# Patient Record
Sex: MALE | Race: WHITE | NOT HISPANIC OR LATINO | Employment: FULL TIME | URBAN - NONMETROPOLITAN AREA
[De-identification: names, ages, dates, MRNs, and addresses within clinical notes are randomized per-mention and may not be internally consistent; named-entity substitution may affect disease eponyms.]

---

## 2017-03-23 ENCOUNTER — OFFICE VISIT (OUTPATIENT)
Dept: ORTHOPEDIC SURGERY | Facility: CLINIC | Age: 27
End: 2017-03-23

## 2017-03-23 DIAGNOSIS — S62.661A CLOSED NONDISPLACED FRACTURE OF DISTAL PHALANX OF LEFT INDEX FINGER, INITIAL ENCOUNTER: Primary | ICD-10-CM

## 2017-03-23 PROCEDURE — 99203 OFFICE O/P NEW LOW 30 MIN: CPT | Performed by: ORTHOPAEDIC SURGERY

## 2017-04-05 PROBLEM — S62.661A CLOSED NONDISPLACED FRACTURE OF DISTAL PHALANX OF LEFT INDEX FINGER: Status: ACTIVE | Noted: 2017-04-05

## 2017-04-20 ENCOUNTER — OFFICE VISIT (OUTPATIENT)
Dept: ORTHOPEDIC SURGERY | Facility: CLINIC | Age: 27
End: 2017-04-20

## 2017-04-20 DIAGNOSIS — S62.661D CLOSED NONDISPLACED FRACTURE OF DISTAL PHALANX OF LEFT INDEX FINGER WITH ROUTINE HEALING, SUBSEQUENT ENCOUNTER: ICD-10-CM

## 2017-04-20 DIAGNOSIS — S62.92XD LEFT HAND FRACTURE, WITH ROUTINE HEALING, SUBSEQUENT ENCOUNTER: Primary | ICD-10-CM

## 2017-04-20 PROCEDURE — 73120 X-RAY EXAM OF HAND: CPT | Performed by: ORTHOPAEDIC SURGERY

## 2017-04-20 PROCEDURE — 99213 OFFICE O/P EST LOW 20 MIN: CPT | Performed by: ORTHOPAEDIC SURGERY

## 2017-04-20 NOTE — PROGRESS NOTES
Chief Complaint   Patient presents with   • Left Hand - Follow-up   • Post-op     LEFT FINGER FRACTURE   Patient is here today for a follow up on his left finger fracture. He states that he is still having trouble with it.    HPI patient is doing a little bit better than before.  He had sustained a crushing injury to the soft tissue and the bone of the P3 phalanx of the left index finger while he was at work.  He works in the local [employer].  His swelling and bruising are starting to settle down.  He still has quite a bit of discomfort especially when he tries to make a fist.  He is not able to lift any objects heavier than 5 or 7 pounds because of the pain and discomfort.  Overall he feels like he is improving and getting somewhat better.  He is not ready to go back to work in a safe fashion just yet.        There were no vitals filed for this visit.        Joint/Body Part Specific Exam:  Left hand: The index finger swelling and bruising is starting to settle down.  The swelling is much less than before.  There is no rotatory malalignment.  Cap refill is 2 seconds with a brisk return.  The subungual hematoma is starting to settle down as well.  He is not yet lost the nail plate.  Flexor and extensor tendon function are well preserved.  Local tenderness is consistent with a healing fracture of the P3 of the index finger.  He is able to flex his finger at both the PIP and the DIP joints although not quite able to make a fist just      X-RAY REPORT:  left Hand X-Ray  Indication: Evaluation of healing of the P3 fracture of the index finger  AP, Lateral views  Findings: Acceptable alignment of the P3 fracture with some early callus formation  no bony lesion  Soft tissues within normal limits  within normal limits joint spaces  Hardware appropriately positioned not applicable      yes prior studies available for comparison.    X-RAY was ordered and reviewed by Brad Posadas MD        Jugren was seen today for post-op  and follow-up.    Diagnoses and all orders for this visit:    Left hand fracture, with routine healing, subsequent encounter  -     XR Hand 2 View Left            Procedures        Instructions:      Plan:Continue to splint the finger to allow the distal phalangeal fracture to heal.    Tablet ibuprofen 600 mg tab 1 by mouth twice a day when necessary pain and discomfort.    Gentle active mobilization of the PIP and the DIP joints to prevent posttraumatic stiffness.    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    Re-injury precautions.    Follow-up in my office in 4 weeks for reevaluation and repeat x-rays.

## 2017-04-24 PROBLEM — S62.92XA LEFT HAND FRACTURE: Status: ACTIVE | Noted: 2017-04-24

## 2017-05-18 ENCOUNTER — OFFICE VISIT (OUTPATIENT)
Dept: ORTHOPEDIC SURGERY | Facility: CLINIC | Age: 27
End: 2017-05-18

## 2017-05-18 DIAGNOSIS — S62.631D CLOSED DISPLACED FRACTURE OF DISTAL PHALANX OF LEFT INDEX FINGER WITH ROUTINE HEALING, SUBSEQUENT ENCOUNTER: Primary | ICD-10-CM

## 2017-05-18 PROCEDURE — 73140 X-RAY EXAM OF FINGER(S): CPT | Performed by: ORTHOPAEDIC SURGERY

## 2017-05-18 PROCEDURE — 99213 OFFICE O/P EST LOW 20 MIN: CPT | Performed by: ORTHOPAEDIC SURGERY

## 2017-05-22 PROBLEM — S62.638D: Status: ACTIVE | Noted: 2017-05-22

## 2017-06-22 ENCOUNTER — OFFICE VISIT (OUTPATIENT)
Dept: ORTHOPEDIC SURGERY | Facility: CLINIC | Age: 27
End: 2017-06-22

## 2017-06-22 DIAGNOSIS — S62.661D CLOSED NONDISPLACED FRACTURE OF DISTAL PHALANX OF LEFT INDEX FINGER WITH ROUTINE HEALING, SUBSEQUENT ENCOUNTER: Primary | ICD-10-CM

## 2017-06-22 DIAGNOSIS — S62.631D CLOSED DISPLACED FRACTURE OF DISTAL PHALANX OF LEFT INDEX FINGER WITH ROUTINE HEALING, SUBSEQUENT ENCOUNTER: ICD-10-CM

## 2017-06-22 PROCEDURE — 99213 OFFICE O/P EST LOW 20 MIN: CPT | Performed by: ORTHOPAEDIC SURGERY

## 2017-06-22 PROCEDURE — 73140 X-RAY EXAM OF FINGER(S): CPT | Performed by: ORTHOPAEDIC SURGERY

## 2017-06-22 NOTE — PROGRESS NOTES
Chief Complaint   Patient presents with   • Left Hand - Follow-up   • Hand Injury     LEFT INDEX FINGER   Patient is here today to follow up on a left index finger fracture. He is doing well.      HPI overall the healing process is quite satisfactory.  He is not able to bend his finger and make a fist.  There is no clinical deformity.  He is able to lift loads up to 20 and 30 pounds without any difficulty whatsoever.  His nail plate is somewhat ranged from the trauma to the P3 tuft.that does not seem to bother the patient because he has told me that the cosmetic issue is not a concern of his.  His pain is declined to the point that he would like to go on back to work at this point.    There were no vitals filed for this visit.        Joint/Body Part Specific Exam:Left hand and index finger: The nail bed is ranged.  Local tenderness is consistent with a healing fracture of the distal tuft of the P3.  The patient is able to make a fist without any issues.  There is no rotatory malalignment.  Flexor and extensor tendon function are both well preserved.  There is no clinical deformity.  Axial loading of the finger does not bother the patient as it used to before at a prior follow-up.    X-RAY REPORT:  left Hand X-Ray  Indication: Evaluation of healing of a distal phalangeal fracture of the index finger  AP, Lateral views  Findings: Acceptable position of the fracture fragments which are showing some callus formation at this point  no bony lesion  Soft tissues normal  within normal limits joint spaces  Hardware appropriately positioned not applicable      yes prior studies available for comparison.    X-RAY was ordered and reviewed by Brad Posadas MD      Jurgen was seen today for hand injury and follow-up.    Diagnoses and all orders for this visit:    Closed nondisplaced fracture of distal phalanx of left index finger with routine healing, subsequent encounter  -     XR Finger 2+ View  Left            Procedures        Instructions:      Plan:Care of the nailbed.    Aggressive home based exercise program with stretching and strengthening exercises of the PIP and DIP joints of the index finger.    Tablet ibuprofen 600 mg tab 1 by mouth twice a day.  Pain and discomfort.    Patient is requesting to be released to go back to work where he is working in a local [employer].  No restrictions are being based on this patient.  Patient will return back to work with affect from 26th of June 2017.     Reinjury precautions discussed with the patient great detail.    Follow-up in my office in 3 months for reevaluation and repeat x-rays.

## 2017-11-16 ENCOUNTER — OFFICE VISIT (OUTPATIENT)
Dept: ORTHOPEDIC SURGERY | Facility: CLINIC | Age: 27
End: 2017-11-16

## 2017-11-16 DIAGNOSIS — S62.631D CLOSED DISPLACED FRACTURE OF DISTAL PHALANX OF LEFT INDEX FINGER WITH ROUTINE HEALING, SUBSEQUENT ENCOUNTER: Primary | ICD-10-CM

## 2017-11-16 PROCEDURE — 73120 X-RAY EXAM OF HAND: CPT | Performed by: ORTHOPAEDIC SURGERY

## 2017-11-16 PROCEDURE — 99214 OFFICE O/P EST MOD 30 MIN: CPT | Performed by: ORTHOPAEDIC SURGERY

## 2017-11-26 NOTE — PROGRESS NOTES
No chief complaint on file.    CC: Follow up on her left index finger distal fracture.      HPI patient is doing extremely well at this point.  He does not have a clinical deformity.  He is able to make a fist without any difficulty.  The swelling and bruising is settled down nicely.  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  The patient states that he is become very careful at work to prevent another injury to this area.  I have discussed with him about progressing further with his Workmen's Compensation manner and how he needs to address it so as to appropriately close this claim.  He does not have any issues from the fracture of the index finger which has healed uneventfully        There were no vitals filed for this visit.        Review of Systems   Constitutional: Negative.    HENT: Negative.    Eyes: Negative.    Respiratory: Negative.    Cardiovascular: Negative.    Gastrointestinal: Negative.    Endocrine: Negative.    Genitourinary: Negative.    Musculoskeletal: Negative.    Skin: Negative.    Allergic/Immunologic: Negative.    Neurological: Negative.    Hematological: Negative.    Psychiatric/Behavioral: Negative.            Physical Exam   Constitutional: He is oriented to person, place, and time. He appears well-nourished.   HENT:   Head: Atraumatic.   Eyes: EOM are normal.   Neck: Neck supple.   Cardiovascular: Normal rate and intact distal pulses.    Pulmonary/Chest: Effort normal and breath sounds normal.   Abdominal: Bowel sounds are normal.   Musculoskeletal: He exhibits edema and tenderness.   Neurological: He is alert and oriented to person, place, and time. He has normal reflexes.   Skin: Skin is dry.   Psychiatric: He has a normal mood and affect. His behavior is normal. Judgment and thought content normal.   Nursing note and vitals reviewed.          Joint/Body Part Specific Exam:  Left hand: The patient is right-hand  dominant.  The index finger shows some swelling.  The local tenderness is consistent with a healing fracture of the distal the 3.  There is no neurovascular deficit.  Flexor and extensor tendon function are both well preserved.  He can make a fist without too much difficulty.  The cascade of the fingers is well preserved.  Refill is 2 seconds with a brisk return.  Local tenderness is consistent with a healed fracture of the P3 of the index finger.  Late itself has regrown and is intact.      X-RAY Report:  left Hand X-Ray  Indication: Evaluation of healing of the P3 fracture of the index finger  AP, Lateral views  Findings: Acceptable position of the fracture fragments with callus formation noted  no bony lesion  Soft tissues within normal limits  within normal limits joint spaces  Hardware appropriately positioned not applicable      yes prior studies available for comparison.    X-RAY was ordered and reviewed by Brad Posadas MD        Diagnostics:        Diagnoses and all orders for this visit:    Closed displaced fracture of distal phalanx of left index finger with routine healing, subsequent encounter            Procedures        Plan:  Home based exercise program with stretching and strengthening exercises.    Use a protective finger stall if he is lifting any heavy weights.    Tablet ibuprofen 600 mg orally daily at bedtime when necessary pain and discomfort.    Discussed with the patient about preventing a repeat injury.    Issues with regards to his Workmen's Compensation manner discussed with him in great detail including going forward with settling this claim.  Discussion with the patient in the office today was to the extent of 30 minutes.  Greater than 50% of this time was spent counseling the patient with regards to scheduling his Workmen's Compensation manner and preventing reinjuries of his hand.    As of this date and physical examination he is at [facility].    Follow-up in my office on a when necessary  basis.